# Patient Record
Sex: FEMALE | Race: WHITE | NOT HISPANIC OR LATINO | Employment: PART TIME | URBAN - METROPOLITAN AREA
[De-identification: names, ages, dates, MRNs, and addresses within clinical notes are randomized per-mention and may not be internally consistent; named-entity substitution may affect disease eponyms.]

---

## 2017-08-24 ENCOUNTER — HOSPITAL ENCOUNTER (EMERGENCY)
Facility: HOSPITAL | Age: 22
Discharge: HOME | End: 2017-08-24
Attending: EMERGENCY MEDICINE

## 2017-08-24 VITALS
BODY MASS INDEX: 19.29 KG/M2 | DIASTOLIC BLOOD PRESSURE: 79 MMHG | TEMPERATURE: 99 F | RESPIRATION RATE: 16 BRPM | WEIGHT: 120 LBS | HEART RATE: 85 BPM | OXYGEN SATURATION: 100 % | SYSTOLIC BLOOD PRESSURE: 127 MMHG | HEIGHT: 66 IN

## 2017-08-24 DIAGNOSIS — S65.312A LACERATION OF DEEP PALMAR ARCH OF LEFT HAND, INITIAL ENCOUNTER: ICD-10-CM
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
## 2017-08-24 DIAGNOSIS — S51.011A LACERATION OF RIGHT ELBOW, INITIAL ENCOUNTER: Primary | ICD-10-CM

## 2017-08-24 LAB
B-HCG UR QL: NEGATIVE
CTP QC/QA: YES

## 2017-08-24 PROCEDURE — 12031 INTMD RPR S/A/T/EXT 2.5 CM/<: CPT

## 2017-08-24 PROCEDURE — 81025 URINE PREGNANCY TEST: CPT | Performed by: PHYSICIAN ASSISTANT

## 2017-08-24 PROCEDURE — 12002 RPR S/N/AX/GEN/TRNK2.6-7.5CM: CPT | Mod: 59

## 2017-08-24 PROCEDURE — 99284 EMERGENCY DEPT VISIT MOD MDM: CPT | Mod: 25

## 2017-08-24 PROCEDURE — 12002 RPR S/N/AX/GEN/TRNK2.6-7.5CM: CPT | Mod: ,,, | Performed by: PHYSICIAN ASSISTANT

## 2017-08-24 PROCEDURE — 25000003 PHARM REV CODE 250: Performed by: PHYSICIAN ASSISTANT

## 2017-08-24 PROCEDURE — 99284 EMERGENCY DEPT VISIT MOD MDM: CPT | Mod: 25,,, | Performed by: PHYSICIAN ASSISTANT

## 2017-08-24 RX ORDER — CEPHALEXIN 500 MG/1
500 CAPSULE ORAL 4 TIMES DAILY
Qty: 20 CAPSULE | Refills: 0 | Status: SHIPPED | OUTPATIENT
Start: 2017-08-24 | End: 2017-08-29

## 2017-08-24 RX ORDER — LIDOCAINE HYDROCHLORIDE 10 MG/ML
10 INJECTION INFILTRATION; PERINEURAL
Status: COMPLETED | OUTPATIENT
Start: 2017-08-24 | End: 2017-08-24

## 2017-08-24 RX ORDER — NAPROXEN 500 MG/1
500 TABLET ORAL
Status: COMPLETED | OUTPATIENT
Start: 2017-08-24 | End: 2017-08-24

## 2017-08-24 RX ORDER — BACITRACIN 500 [USP'U]/G
OINTMENT TOPICAL 2 TIMES DAILY
Status: COMPLETED | OUTPATIENT
Start: 2017-08-24 | End: 2017-08-24

## 2017-08-24 RX ADMIN — NAPROXEN 500 MG: 500 TABLET ORAL at 09:08

## 2017-08-24 RX ADMIN — LIDOCAINE HYDROCHLORIDE 10 ML: 10 INJECTION, SOLUTION INFILTRATION; PERINEURAL at 09:08

## 2017-08-24 RX ADMIN — BACITRACIN: 500 OINTMENT TOPICAL at 09:08

## 2017-08-24 NOTE — ED NOTES
Appearance: Pt awake, alert & oriented to person, place & time. Pt in no acute distress at present time.   Skin: Skin warm, dry. Mucous membranes moist. Skin turgor normal. Scattered abrasions to alcides arms. Left hand laceration, right elbow laceration, left upper arm laceration. Bleeding controlled  Respiratory: Respirations even, non-labored.   Neurologic: Pt moving all extremities without difficulty. Sensation intact.   Peripheral Vascular: All peripheral pulses present.

## 2017-08-24 NOTE — DISCHARGE INSTRUCTIONS
Wash with soap and water daily.  Apply a thin film of antibiotic ointment.    Keep dry and covered.    Return to the ER for fever, redness around the wounds, cloudy drainage or pus draining from the wound or for any new or concerning symptoms.

## 2017-08-24 NOTE — ED PROVIDER NOTES
"Encounter Date: 2017       History     Chief Complaint   Patient presents with    Laceration     "fell through a glass door" this morning. states she tripped and tried to catch herself. hands with through glass.      This is a 22 year old female with no known significant PMH who presents to the ED with a chief complaint of laceration. Patient reports falling through a glass door in her home prior to arrival. She sustained multiple abrasions and lacerations of the extremities. She denies head injury or LOC. Her tetanus is up to date. She denies fever, chills, headache, N/V, abdominal pain, weakness or numbness.          Review of patient's allergies indicates:   Allergen Reactions    Sulfa (sulfonamide antibiotics)      Past Medical History:   Diagnosis Date    Eclampsia affecting first pregnancy     with seizure     Past Surgical History:   Procedure Laterality Date     SECTION       History reviewed. No pertinent family history.  Social History   Substance Use Topics    Smoking status: Current Every Day Smoker     Packs/day: 0.50     Types: Cigarettes    Smokeless tobacco: Never Used    Alcohol use Yes      Comment: occasionally     Review of Systems   Constitutional: Negative for chills and fever.   HENT: Negative for sore throat.    Respiratory: Negative for shortness of breath.    Cardiovascular: Negative for chest pain.   Gastrointestinal: Negative for nausea and vomiting.   Genitourinary: Negative for dysuria.   Musculoskeletal: Negative for back pain and neck pain.   Skin: Positive for wound. Negative for rash.   Neurological: Negative for weakness.   Hematological: Does not bruise/bleed easily.       Physical Exam     Initial Vitals [17 0800]   BP Pulse Resp Temp SpO2   130/80 91 17 99.2 °F (37.3 °C) 97 %      MAP       96.67         Physical Exam    Constitutional: She appears well-developed and well-nourished. No distress.   HENT:   Head: Atraumatic.   Eyes: Conjunctivae and EOM " are normal. Pupils are equal, round, and reactive to light.   Neck: Normal range of motion. Neck supple.   Cardiovascular: Normal rate, regular rhythm and normal heart sounds.   Pulmonary/Chest: Breath sounds normal. No respiratory distress. She has no wheezes. She has no rhonchi. She has no rales.   Abdominal: Soft. Bowel sounds are normal. There is no tenderness. There is no rebound and no guarding.   Musculoskeletal:        Right elbow: She exhibits swelling and laceration. She exhibits normal range of motion. Tenderness found.        Left hand: She exhibits tenderness, laceration and swelling. She exhibits normal range of motion and normal capillary refill. Normal sensation noted. Normal strength noted.   Neurological: She is alert and oriented to person, place, and time.   Skin: Skin is warm and dry. Abrasion and laceration noted. No rash noted.        3cm superficial laceration of the left upper arm  2cm laceration over the left thenar eminence  1cm laceration of right elbow  Multiple superficial abrasions         ED Course   Lac Repair  Date/Time: 8/24/2017 10:50 AM  Performed by: CLARA LONG  Authorized by: ANTHONY KU   Body area: upper extremity  Location details: right elbow  Laceration length: 1 cm  Foreign bodies: no foreign bodies  Tendon involvement: none  Nerve involvement: none  Vascular damage: no  Anesthesia: local infiltration    Anesthesia:  Local Anesthetic: lidocaine 1% without epinephrine  Anesthetic total: 1 mL  Preparation: Patient was prepped and draped in the usual sterile fashion.  Irrigation solution: saline  Irrigation method: syringe  Amount of cleaning: extensive  Debridement: none  Degree of undermining: none  Skin closure: 5-0 nylon  Number of sutures: 2  Technique: simple  Approximation: close  Approximation difficulty: simple  Dressing: non-stick sterile dressing  Patient tolerance: Patient tolerated the procedure well with no immediate complications    Lac  Repair  Date/Time: 8/24/2017 10:53 AM  Performed by: CLARA LONG  Authorized by: ANTHONY KU   Body area: upper extremity  Location details: left hand  Laceration length: 2 cm  Foreign bodies: no foreign bodies  Tendon involvement: none  Nerve involvement: none  Anesthesia: local infiltration    Anesthesia:  Local Anesthetic: lidocaine 1% without epinephrine  Anesthetic total: 2 mL  Preparation: Patient was prepped and draped in the usual sterile fashion.  Irrigation solution: saline  Irrigation method: syringe  Amount of cleaning: standard  Debridement: none  Degree of undermining: none  Skin closure: 5-0 nylon  Number of sutures: 4  Dressing: non-stick sterile dressing  Patient tolerance: Patient tolerated the procedure well with no immediate complications    Lac Repair  Date/Time: 8/24/2017 10:54 AM  Performed by: CLARA LONG  Authorized by: ANTHONY KU   Body area: upper extremity  Location details: left upper arm  Laceration length: 3 cm  Tendon involvement: none  Nerve involvement: none  Vascular damage: no  Preparation: Patient was prepped and draped in the usual sterile fashion.  Irrigation solution: saline  Irrigation method: syringe  Amount of cleaning: standard  Skin closure: glue  Patient tolerance: Patient tolerated the procedure well with no immediate complications        Labs Reviewed   POCT URINE PREGNANCY                   APC / Resident Notes:   22 year old female presents with multiple wounds sustained from falling through a glass door. There are lacerations of the left hand and right elbow requiring repair with sutures as detailed in procedure note. There is a superficial left upper arm laceration repaired with dermabond as detailed in procedure note. Musculoskeletal survey demonstrated tenderness of the right elbow and left hand. X-rays are negative for fracture or foreign body. Additionally, wounds were thoroughly irrigated and explored to the base without  evidence of foreign body, tendon or vascular injury.     Patient placed on kelfex for infection prophylaxis.  Advised pt to return in 7 days for suture removal.   Wound care instructions were discussed and return to ED precautions given. She is stable for discharge. I discussed the care of this patient with my supervising MD.          Attending Attestation:     Physician Attestation Statement for NP/PA:   I discussed this assessment and plan of this patient with the NP/PA, but I did not personally examine the patient. The face to face encounter was performed by the NP/PA.                  ED Course     Clinical Impression:   The primary encounter diagnosis was Laceration of right elbow, initial encounter. Diagnoses of Fall and Laceration of deep palmar arch of left hand, initial encounter were also pertinent to this visit.    Disposition:   Disposition: Discharged  Condition: Stable                        DEWAYNE Gtz  08/24/17 1554       Carroll Coats MD  08/31/17 2030

## 2017-08-24 NOTE — ED TRIAGE NOTES
Tripped and fell through a glass door this am. Left upper arm laceration - right elbow laceration. Left hand scattered lacerations. Scattered abrasions.